# Patient Record
(demographics unavailable — no encounter records)

---

## 2024-10-31 NOTE — HISTORY OF PRESENT ILLNESS
[FreeTextEntry1] : here for work form to be completed feeling well dgtr to graduate from [employer] school in Feb 2025

## 2024-10-31 NOTE — PHYSICAL EXAM
[No Acute Distress] : no acute distress [Normal Sclera/Conjunctiva] : normal sclera/conjunctiva [EOMI] : extraocular movements intact [Normal Outer Ear/Nose] : the outer ears and nose were normal in appearance [No JVD] : no jugular venous distention [Normal] : normal rate, regular rhythm, normal S1 and S2 and no murmur heard [Coordination Grossly Intact] : coordination grossly intact [Normal Affect] : the affect was normal [Normal Insight/Judgement] : insight and judgment were intact

## 2025-02-06 NOTE — HISTORY OF PRESENT ILLNESS
[FreeTextEntry1] : F/u for diabetes management  *** Feb 06, 2025 ***  feels great, good energy, tolerates her regimen well lost more weight on Ozempic  0.5 mg qw, Metformin  mg 2 tab daily,  losartan 100 mg daily, Crestor 10 mg HS   reports fbs- 80's- 90's, not checking ppg  *** Jul 30, 2024 ***  feels well. lost 12 lbs since starting Ozempic - presently on 0.5 mg qw good energy, walks more also, remains on Metformin  mg 2 tab daily,  losartan 100 mg daily, Crestor 10 mg HS  (recently resumed) labs from 6/10/24- a1c- 5.6%, creat- 0.75, LDL- 169, TG- 160, 25D- 23.4 reports ppg- low 100's  *** Mar 14, 2024 ***  doing well, denies any c/o saw hepatologist ([facility])- all stable, she discussed GLP1RA taking on Metformin   mg 2 tabs daily, losartan 100 mg daily, Crestor 10 mg HS reports occas ppg in 110's not using Rosendo  *** Nov 22, 2023 ***  doing well, has been on a diet. trying to avoid high fat/carbs food stopped wearing Rosendo on Metformin   mg  daily, losartan 100 mg daily, Crestor 10 mg HS reports fbs- 120's- 130's  *** Aug 28, 2023 ***  feels fine, trying to control her diet. weight is overall stable saw hepatologist- he wants to wait with GLP1RA on Metformin   mg  daily, losartan 100 mg daily, Crestor 10 mg HS a1c- 6.3, 25D- 21, ca- 9.6, LDL- 74 reports fbs- 110's- 120's  Abd US (8/21/23)- hepatic steatosis DXA (8/21/23)- LS (+2.2), TRH (+1.7), TLH (+1.3)   HISTORY OF PRESENT ILLNESS.   Ms. OGDEN was diagnosed with Diabetes Mellitus Type 2 in 2022. She reports history HTN, dyslipidemia, and denies CAD,  known complications of retinopathy, nephropathy, or neuropathy. She is presently on metformin  mg  daily, losartan 100 mg daily, atorvastatin 10 mg at bedtime Blood sugars are not checked at home.  Fingerstick glucose in the office today is 126 mg/dL 4 hours after eating.  Diet: not following ADA Exercise: none  Lab review: a1c-6.5%, LDL-108, triglycerides-253, calcium-9.5, creatinine-0.65, 25-hydroxy vitamin D-31  Last dilated eye - 2024 Last podiatry visit  - none Last cardiology evaluation - 6/23 Last stress test - none Last 2-D Echo -none Last nephrology evaluation - none Last neurology evaluation-none

## 2025-02-06 NOTE — ASSESSMENT
[Diabetes Foot Care] : diabetes foot care [Long Term Vascular Complications] : long term vascular complications of diabetes [Carbohydrate Consistent Diet] : carbohydrate consistent diet [Importance of Diet and Exercise] : importance of diet and exercise to improve glycemic control, achieve weight loss and improve cardiovascular health [Exercise/Effect on Glucose] : exercise/effect on glucose [Hypoglycemia Management] : hypoglycemia management [Glucagon Use] : glucagon use [Self Monitoring of Blood Glucose] : self monitoring of blood glucose [Retinopathy Screening] : Patient was referred to ophthalmology for retinopathy screening [Diabetic Medications] : Risks and benefits of diabetic medications were discussed [FreeTextEntry1] : 1. T2DM, well controlled - Ozempic 0.5 mg qw - Metformin  mg 1 tab daily - resume Rosendo 3 - Losartan 100 mg qd - Crestor 10 mg HS - monitor microalbumin  2. Screening for OP - calcium 500 mg bid, extra OTC vitamin D3 2,000 IU/day - continue weight-bearing exercises; advised avoiding high risk sports - DXA 3 sites  RTC 4 months.

## 2025-06-05 NOTE — HISTORY OF PRESENT ILLNESS
[FreeTextEntry1] : F/u for diabetes management  *** Jun 05, 2025 ***  feels well under stress b/o mom's health weight has been stable on Ozempic  0.5 mg qw, Metformin  mg 1 tab daily,  losartan 100 mg daily, Crestor 10 mg HS  by report, random ppg- 120's today ppg in the office- 113  *** Feb 06, 2025 ***  feels great, good energy, tolerates her regimen well lost more weight on Ozempic  0.5 mg qw, Metformin  mg 2 tab daily,  losartan 100 mg daily, Crestor 10 mg HS   reports fbs- 80's- 90's, not checking ppg  *** Jul 30, 2024 ***  feels well. lost 12 lbs since starting Ozempic - presently on 0.5 mg qw good energy, walks more also, remains on Metformin  mg 2 tab daily,  losartan 100 mg daily, Crestor 10 mg HS  (recently resumed) labs from 6/10/24- a1c- 5.6%, creat- 0.75, LDL- 169, TG- 160, 25D- 23.4 reports ppg- low 100's  *** Mar 14, 2024 ***  doing well, denies any c/o saw hepatologist ([facility])- all stable, she discussed GLP1RA taking on Metformin   mg 2 tabs daily, losartan 100 mg daily, Crestor 10 mg HS reports occas ppg in 110's not using Rosendo  *** Nov 22, 2023 ***  doing well, has been on a diet. trying to avoid high fat/carbs food stopped wearing Rosendo on Metformin   mg  daily, losartan 100 mg daily, Crestor 10 mg HS reports fbs- 120's- 130's  *** Aug 28, 2023 ***  feels fine, trying to control her diet. weight is overall stable saw hepatologist- he wants to wait with GLP1RA on Metformin   mg  daily, losartan 100 mg daily, Crestor 10 mg HS a1c- 6.3, 25D- 21, ca- 9.6, LDL- 74 reports fbs- 110's- 120's  Abd US (8/21/23)- hepatic steatosis DXA (8/21/23)- LS (+2.2), TRH (+1.7), TLH (+1.3)   HISTORY OF PRESENT ILLNESS.   Ms. OGDEN was diagnosed with Diabetes Mellitus Type 2 in 2022. She reports history HTN, dyslipidemia, and denies CAD,  known complications of retinopathy, nephropathy, or neuropathy. She is presently on metformin  mg  daily, losartan 100 mg daily, atorvastatin 10 mg at bedtime Blood sugars are not checked at home.  Fingerstick glucose in the office today is 126 mg/dL 4 hours after eating.  Diet: not following ADA Exercise: none  Lab review: a1c-6.5%, LDL-108, triglycerides-253, calcium-9.5, creatinine-0.65, 25-hydroxy vitamin D-31  Last dilated eye - 2024 Last podiatry visit  - none Last cardiology evaluation - 6/23 Last stress test - none Last 2-D Echo -none Last nephrology evaluation - none Last neurology evaluation-none

## 2025-06-05 NOTE — ASSESSMENT
[Diabetes Foot Care] : diabetes foot care [Long Term Vascular Complications] : long term vascular complications of diabetes [Carbohydrate Consistent Diet] : carbohydrate consistent diet [Importance of Diet and Exercise] : importance of diet and exercise to improve glycemic control, achieve weight loss and improve cardiovascular health [Exercise/Effect on Glucose] : exercise/effect on glucose [Hypoglycemia Management] : hypoglycemia management [Glucagon Use] : glucagon use [Self Monitoring of Blood Glucose] : self monitoring of blood glucose [Retinopathy Screening] : Patient was referred to ophthalmology for retinopathy screening [Diabetic Medications] : Risks and benefits of diabetic medications were discussed [FreeTextEntry1] : 1. T2DM, well controlled - Ozempic 0.5 mg qw - Metformin  mg 1 tab daily - resume Rosendo 3 - Losartan 100 mg qd - Crestor 10 mg HS - monitor microalbumin  2. Screening for OP - calcium 500 mg bid, extra OTC vitamin D3 2,000 IU/day - continue weight-bearing exercises; advised avoiding high risk sports - DXA 3 sites in 08/25  RTC 4 months.